# Patient Record
Sex: MALE | Race: WHITE | NOT HISPANIC OR LATINO | URBAN - METROPOLITAN AREA
[De-identification: names, ages, dates, MRNs, and addresses within clinical notes are randomized per-mention and may not be internally consistent; named-entity substitution may affect disease eponyms.]

---

## 2019-02-13 ENCOUNTER — OFFICE VISIT (OUTPATIENT)
Dept: URBAN - METROPOLITAN AREA CLINIC 11 | Facility: CLINIC | Age: 69
End: 2019-02-13
Payer: MEDICARE

## 2019-02-13 PROCEDURE — 92014 COMPRE OPH EXAM EST PT 1/>: CPT | Performed by: OPTOMETRIST

## 2019-02-13 PROCEDURE — 92134 CPTRZ OPH DX IMG PST SGM RTA: CPT | Performed by: OPTOMETRIST

## 2019-02-13 ASSESSMENT — INTRAOCULAR PRESSURE
OD: 18
OS: 18

## 2019-02-13 NOTE — IMPRESSION/PLAN
Impression: Diagnosis: Tributary (branch) retinal vein occlusion, right eye, stable. Code: U73.1700. Plan: OCT of macula ordered and performed. No benefit of treatment given chronicity. Monitor for NVD and NVE - none seen today. Monocular precautions reviewed. Advised patient to return to clinic sooner if any change in vision.

## 2019-05-15 ENCOUNTER — TESTING ONLY (OUTPATIENT)
Dept: URBAN - METROPOLITAN AREA CLINIC 11 | Facility: CLINIC | Age: 69
End: 2019-05-15

## 2019-05-15 ASSESSMENT — VISUAL ACUITY
OS: 20/25
OD: CF

## 2021-01-21 ENCOUNTER — OFFICE VISIT (OUTPATIENT)
Dept: URBAN - METROPOLITAN AREA CLINIC 11 | Facility: CLINIC | Age: 71
End: 2021-01-21
Payer: MEDICARE

## 2021-01-21 DIAGNOSIS — H52.4 PRESBYOPIA: ICD-10-CM

## 2021-01-21 PROCEDURE — 92014 COMPRE OPH EXAM EST PT 1/>: CPT | Performed by: OPTOMETRIST

## 2021-01-21 PROCEDURE — 92134 CPTRZ OPH DX IMG PST SGM RTA: CPT | Performed by: OPTOMETRIST

## 2021-01-21 ASSESSMENT — INTRAOCULAR PRESSURE
OS: 18
OD: 18

## 2021-01-21 NOTE — IMPRESSION/PLAN
Impression: Diagnosis: Tributary (branch) retinal vein occlusion, right eye, stable. Code: Q59.8971. Plan: OCT of macula ordered and performed. No benefit of treatment given chronicity. Monitor for NVD and NVE - none seen today. Monocular precautions reviewed. Advised patient to return to clinic sooner if any change in vision.

## 2021-01-21 NOTE — IMPRESSION/PLAN
Impression: Age-related nuclear cataract, bilateral: H25.13. Plan: Discussed diagnosis in detail with patient. No treatment is required at this time. Will continue to observe condition and or symptoms. Call if [address] worsens.

## 2021-01-21 NOTE — ASSESSMENT/PLAN
Impression: OCT MAC - OD: Good-scarring, tr edema; OS: Good-normal Plan: monitor. No change in treatment based on test results.

## 2021-08-26 ENCOUNTER — OFFICE VISIT (OUTPATIENT)
Dept: URBAN - METROPOLITAN AREA CLINIC 11 | Facility: CLINIC | Age: 71
End: 2021-08-26

## 2021-08-26 ASSESSMENT — INTRAOCULAR PRESSURE
OD: 14
OS: 14

## 2021-08-26 ASSESSMENT — VISUAL ACUITY
OS: 20/20
OD: CF 1FT

## 2021-08-26 ASSESSMENT — KERATOMETRY
OD: 44.13
OS: 44.25

## 2022-01-27 ENCOUNTER — OFFICE VISIT (OUTPATIENT)
Dept: URBAN - METROPOLITAN AREA CLINIC 11 | Facility: CLINIC | Age: 72
End: 2022-01-27
Payer: MEDICARE

## 2022-01-27 DIAGNOSIS — H34.8312 TRIBUTARY (BRANCH) RETINAL VEIN OCCLUSION, RIGHT EYE, STABLE: Primary | ICD-10-CM

## 2022-01-27 DIAGNOSIS — H33.101 RETINOSCHISIS OF RIGHT EYE: ICD-10-CM

## 2022-01-27 PROCEDURE — 92014 COMPRE OPH EXAM EST PT 1/>: CPT | Performed by: OPTOMETRIST

## 2022-01-27 PROCEDURE — 92134 CPTRZ OPH DX IMG PST SGM RTA: CPT | Performed by: OPTOMETRIST

## 2022-01-27 ASSESSMENT — INTRAOCULAR PRESSURE
OS: 16
OD: 16

## 2022-01-27 NOTE — IMPRESSION/PLAN
Impression: Diagnosis: Tributary (branch) retinal vein occlusion, right eye, stable. Code: E95.4166. Plan: OCT of macula ordered and performed. No benefit of treatment given chronicity. Monitor for NVD and NVE - none seen today. Monocular precautions reviewed. Advised patient to return to clinic sooner if any change in vision.

## 2022-01-27 NOTE — IMPRESSION/PLAN
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 Plan: Refer to retinal specialist for eval/tx/second opinion.

## 2022-02-14 ENCOUNTER — OFFICE VISIT (OUTPATIENT)
Dept: URBAN - METROPOLITAN AREA CLINIC 11 | Facility: CLINIC | Age: 72
End: 2022-02-14
Payer: MEDICARE

## 2022-02-14 DIAGNOSIS — H31.011 MACULA SCAR OF POSTERIOR POLE OF RIGHT EYE: Primary | ICD-10-CM

## 2022-02-14 DIAGNOSIS — H25.13 AGE-RELATED NUCLEAR CATARACT, BILATERAL: ICD-10-CM

## 2022-02-14 PROCEDURE — 99204 OFFICE O/P NEW MOD 45 MIN: CPT | Performed by: OPHTHALMOLOGY

## 2022-02-14 PROCEDURE — 92134 CPTRZ OPH DX IMG PST SGM RTA: CPT | Performed by: OPHTHALMOLOGY

## 2022-02-14 ASSESSMENT — INTRAOCULAR PRESSURE
OS: 18
OD: 18

## 2022-02-14 NOTE — IMPRESSION/PLAN
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 Plan: Discussed diagnosis in detail with patient. Discussed treatment options with patient. No treatment is required at this time. Discussed risks and benefits and patient understands. Emphasized and explained compliance. Reassured patient of current condition and treatment. Will continue to observe condition and or symptoms. Call if [address] worsens.

## 2023-06-26 ENCOUNTER — TESTING ONLY (OUTPATIENT)
Facility: LOCATION | Age: 73
End: 2023-06-26
Payer: COMMERCIAL

## 2023-06-26 DIAGNOSIS — H52.4 PRESBYOPIA: Primary | ICD-10-CM

## 2023-06-26 DIAGNOSIS — H34.8312 TRIBUTARY (BRANCH) RETINAL VEIN OCCLUSION, RIGHT EYE, STABLE: ICD-10-CM

## 2023-06-26 DIAGNOSIS — H25.13 AGE-RELATED NUCLEAR CATARACT, BILATERAL: ICD-10-CM

## 2023-06-26 PROCEDURE — 92014 COMPRE OPH EXAM EST PT 1/>: CPT

## 2023-06-26 ASSESSMENT — INTRAOCULAR PRESSURE
OD: 16
OS: 15

## 2023-06-26 ASSESSMENT — VISUAL ACUITY: OS: 20/30

## 2023-06-26 ASSESSMENT — KERATOMETRY
OS: 44.75
OD: 44.25

## 2023-06-26 NOTE — IMPRESSION/PLAN
Impression: Diagnosis: Tributary (branch) retinal vein occlusion, right eye, stable. Code: H51.2613. Plan: No benefit of treatment given chronicity. Monitor for NVD and NVE - none seen today. Monocular precautions reviewed. Advised patient to return to clinic sooner if any change in vision.

## 2024-07-05 ENCOUNTER — OFFICE VISIT (OUTPATIENT)
Facility: LOCATION | Age: 74
End: 2024-07-05
Payer: COMMERCIAL

## 2024-07-05 DIAGNOSIS — H34.8312 TRIBUTARY (BRANCH) RETINAL VEIN OCCLUSION, RIGHT EYE, STABLE: Primary | ICD-10-CM

## 2024-07-05 DIAGNOSIS — H31.011 MACULA SCAR OF POSTERIOR POLE OF RIGHT EYE: ICD-10-CM

## 2024-07-05 DIAGNOSIS — H25.813 COMBINED FORMS OF AGE-RELATED CATARACT, BILATERAL: ICD-10-CM

## 2024-07-05 PROCEDURE — 92014 COMPRE OPH EXAM EST PT 1/>: CPT

## 2024-07-05 ASSESSMENT — INTRAOCULAR PRESSURE
OD: 16
OS: 17

## 2024-07-05 ASSESSMENT — VISUAL ACUITY: OS: 20/25

## 2025-08-15 ENCOUNTER — OFFICE VISIT (OUTPATIENT)
Facility: LOCATION | Age: 75
End: 2025-08-15
Payer: COMMERCIAL

## 2025-08-15 DIAGNOSIS — H25.813 COMBINED FORMS OF AGE-RELATED CATARACT, BILATERAL: Primary | ICD-10-CM

## 2025-08-15 DIAGNOSIS — H34.8312 TRIBUTARY (BRANCH) RETINAL VEIN OCCLUSION, RIGHT EYE, STABLE: ICD-10-CM

## 2025-08-15 DIAGNOSIS — H31.011 MACULA SCAR OF POSTERIOR POLE OF RIGHT EYE: ICD-10-CM

## 2025-08-15 PROCEDURE — 92014 COMPRE OPH EXAM EST PT 1/>: CPT

## 2025-08-15 ASSESSMENT — VISUAL ACUITY
OD: CF 3FT
OS: 20/40

## 2025-08-15 ASSESSMENT — INTRAOCULAR PRESSURE
OD: 18
OS: 17

## 2025-08-15 ASSESSMENT — KERATOMETRY
OS: 44.25
OD: 44.00